# Patient Record
(demographics unavailable — no encounter records)

---

## 2024-11-09 NOTE — HISTORY OF PRESENT ILLNESS
[FreeTextEntry8] : 64-year-old male with hypertension presenting today for establishment of care visit and an acute visit.  His last physical was March of this year.  He has had high blood pressure for many years and is taking 4 different classes of medications for blood pressure management.  He states that when he goes to the doctor's office the blood pressure usually goes up however when he checks at home it is usually in the 110s over 70s. He states that he has a high stress job (he is a ) and does not have time to exercise.  He reports sleeping well.  He is reporting a left lower eyelid mole and wants to see a dermatologist.

## 2024-11-09 NOTE — HEALTH RISK ASSESSMENT
[No] : In the past 12 months have you used drugs other than those required for medical reasons? No [No falls in past year] : Patient reported no falls in the past year [0] : 2) Feeling down, depressed, or hopeless: Not at all (0) [Never] : Never [PHQ-2 Negative - No further assessment needed] : PHQ-2 Negative - No further assessment needed [de-identified] : None [de-identified] : None [de-identified] : Normal [de-identified] : Normal [[id number]] : 0

## 2024-11-09 NOTE — COUNSELING
[Encouraged to increase physical activity] : Encouraged to increase physical activity [Good understanding] : Patient has a good understanding of disease, goals and obesity follow-up plan [FreeTextEntry4] : 20

## 2025-01-17 NOTE — ASSESSMENT
[FreeTextEntry1] : 1) Neoplasm of uncertain behavior of skin, left cheek - r/o BCC - shave tangential bx performed The risks/benefits/alternatives of skin biopsy were explained to the patient which include but are not limited to scar, bleeding, infection, and recurrence. The area was prepped with rubbing alcohol, lidocaine was injected for anesthesia and biopsy was performed. The patient tolerated the procedure well.   RTC for FBSE when interested

## 2025-01-17 NOTE — HISTORY OF PRESENT ILLNESS
[FreeTextEntry1] : new pt: bump [de-identified] : 63 y/o M presenting for a bump on the left cheek that has been growing over at least months.   No personal or FH of skin cancer.

## 2025-03-08 NOTE — PLAN
[FreeTextEntry1] :  #HCM - CBC with differential, HIV, Hep C, CMP, hemoglobin A1c, vitamin B12, vitamin D, urine analysis, lipid panel, TFTs all ordered. Patient to be informed of results. - Prostate cancer screening discussed. PSA blood test has been ordered. - The patient has screened negative for depression  - The patient has never used tobacco products. - The [place] Preventive Service Task Force recommends yearly lung cancer screening with LDCT for people who have a 20 pack-year or more smoking history and smoke now or have quit within the past 15 years and are between 50 and 80 years old. - Colon cancer screening was discussed at today's visit; he is due for colonoscopy in 2027. - EKG performed in the office today is within normal limits. - Counseled on maintaining a healthy body weight. It has been estimated that up to one-third of cardiovascular disease deaths may be preventable by healthy diet and physical activity.

## 2025-03-08 NOTE — HEALTH RISK ASSESSMENT
[Good] : ~his/her~  mood as  good [Yes] : Yes [Monthly or less (1 pt)] : Monthly or less (1 point) [1 or 2 (0 pts)] : 1 or 2 (0 points) [Never (0 pts)] : Never (0 points) [No] : In the past 12 months have you used drugs other than those required for medical reasons? No [No falls in past year] : Patient reported no falls in the past year [0] : 2) Feeling down, depressed, or hopeless: Not at all (0) [Never] : Never [NO] : No [Patient reported colonoscopy was normal] : Patient reported colonoscopy was normal [HIV Test offered] : HIV Test offered [Hepatitis C test offered] : Hepatitis C test offered [With Family] : lives with family [Employed] : employed [] :  [# Of Children ___] : has [unfilled] children [Sexually Active] : sexually active [Feels Safe at Home] : Feels safe at home [Fully functional (bathing, dressing, toileting, transferring, walking, feeding)] : Fully functional (bathing, dressing, toileting, transferring, walking, feeding) [Fully functional (using the telephone, shopping, preparing meals, housekeeping, doing laundry, using] : Fully functional and needs no help or supervision to perform IADLs (using the telephone, shopping, preparing meals, housekeeping, doing laundry, using transportation, managing medications and managing finances) [Smoke Detector] : smoke detector [Carbon Monoxide Detector] : carbon monoxide detector [PHQ-2 Negative - No further assessment needed] : PHQ-2 Negative - No further assessment needed [de-identified] : none [de-identified] : none [de-identified] : sometimes [Audit-CScore] : 1 [de-identified] : Normal walk [de-identified] : no diet [[id number]] : 0 [Change in mental status noted] : No change in mental status noted [Language] : denies difficulty with language [Behavior] : denies difficulty with behavior [Learning/Retaining New Information] : denies difficulty learning/retaining new information [Handling Complex Tasks] : denies difficulty handling complex tasks [Reasoning] : denies difficulty with reasoning [Spatial Ability and Orientation] : denies difficulty with spatial ability and orientation [Reports changes in hearing] : Reports no changes in hearing [Reports normal functional visual acuity (ie: able to read med bottle)] : Reports poor functional visual acuity.  [Reports changes in vision] : Reports no changes in vision [Reports changes in dental health] : Reports no changes in dental health [Safety elements used in home] : no safety elements used in home [Seat Belt] : does not use seat belt [Sunscreen] : does not use sunscreen [Travel to Developing Areas] : does not  travel to developing areas [Caregiver Concerns] : does not have caregiver concerns [ColonoscopyDate] : 01/2022

## 2025-03-08 NOTE — COUNSELING
[AUDIT-C Screening administered and reviewed] : AUDIT-C Screening administered and reviewed [FreeTextEntry1] : 20 [Potential consequences of obesity discussed] : Potential consequences of obesity discussed [FreeTextEntry4] : 20

## 2025-03-08 NOTE — HISTORY OF PRESENT ILLNESS
[de-identified] : 64-year-old male with longstanding hypertension and obesity is presenting today for a physical examination.  He states that he checks his blood pressure at home occasionally and usually gets mid 130s systolic over mid 80s diastolic.  He has been on 4 different blood pressure medications for several years.  He reports having whitecoat hypertension.

## 2025-04-30 NOTE — HEALTH RISK ASSESSMENT
[Little interest or pleasure doing things] : 1) Little interest or pleasure doing things [Feeling down, depressed, or hopeless] : 2) Feeling down, depressed, or hopeless [0] : 2) Feeling down, depressed, or hopeless: Not at all (0) [PHQ-2 Negative - No further assessment needed] : PHQ-2 Negative - No further assessment needed [Never] : Never [de-identified] : no [de-identified] : no [CPD0Ccfms] : 0

## 2025-04-30 NOTE — HISTORY OF PRESENT ILLNESS
[FreeTextEntry1] : pt here for a follow-up regarding his blood pressure  [de-identified] : 64-year-old male with longstanding hypertension and obesity presenting today for a follow up visit. He has checked his BP for the past 2 weeks and the recordings are within normal limits. 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 He has gained 10 lbs. in the past 2 months. He reports going for a stress test in the past (stress TTE reviewed from [facility]22 at [facility]). He also reports a bruise on his left foot that he thinks is from driving and being stressed.

## 2025-04-30 NOTE — PLAN
[FreeTextEntry1] :  Total time spent caring for the patient today was 35 minutes. This includes time spent before the visit reviewing the chart, time spent during the visit, and time spent after the visit on documentation, etc.

## 2025-05-05 NOTE — HISTORY OF PRESENT ILLNESS
[FreeTextEntry1] : Mohs surgery for a BCC, pigmented nodular type, on the left cheek/eyelid junction over the nasojugal fold [de-identified] : 05/05/2025  Referred by: Dr. Thomas  Mr. NGHIA RUSS is a 64 year old [place] and English-speaking M who presents for Mohs surgery for a BCC, pigmented nodular type, on the left cheek/eyelid junction over the nasojugal fold. The lesion was growing as a reddish brown bump.  Consult done 03/12/2025   Social History: Originally from near [facility]. Works as a . .   Blood thinners: ASA 81mg and fish oil - preventative (HTN) - no history of stroke Allergies: None Controlled HTN No tobacco

## 2025-05-05 NOTE — HISTORY OF PRESENT ILLNESS
[FreeTextEntry1] : Mohs surgery for a BCC, pigmented nodular type, on the left cheek/eyelid junction over the nasojugal fold [de-identified] : 05/05/2025  Referred by: Dr. Thomas  Mr. NGHIA RUSS is a 64 year old [place] and English-speaking M who presents for Mohs surgery for a BCC, pigmented nodular type, on the left cheek/eyelid junction over the nasojugal fold. The lesion was growing as a reddish brown bump.  Consult done 03/12/2025   Social History: Originally from near [facility]. Works as a . .   Blood thinners: ASA 81mg and fish oil - preventative (HTN) - no history of stroke Allergies: None Controlled HTN No tobacco

## 2025-05-05 NOTE — PHYSICAL EXAM
[Alert] : alert [Oriented x 3] : ~L oriented x 3 [Well Nourished] : well nourished [Conjunctiva Non-injected] : conjunctiva non-injected [No Visual Lymphadenopathy] : no visual  lymphadenopathy [No Clubbing] : no clubbing [No Edema] : no edema [No Bromhidrosis] : no bromhidrosis [No Chromhidrosis] : no chromhidrosis [FreeTextEntry3] : -- 1.0 x0.8cm atrophic bx site over the left nasojugal fold medially

## 2025-05-14 NOTE — HISTORY OF PRESENT ILLNESS
[FreeTextEntry1] : pt is here for follow up imaging  [de-identified] : 64-year-old male with longstanding hypertension and obesity presenting today for a follow up visit. Recent imaging results reviewed with patient today. Patient verbalized understanding and all questions answered. He denies any chest pain or sob with exertion. Reports being told that he had a cyst on his right lung but no intervention was done.

## 2025-05-14 NOTE — COUNSELING
[Potential consequences of obesity discussed] : Potential consequences of obesity discussed [Benefits of weight loss discussed] : Benefits of weight loss discussed [Encouraged to increase physical activity] : Encouraged to increase physical activity [Good understanding] : Patient has a good understanding of disease, goals and obesity follow-up plan [FreeTextEntry4] : 20

## 2025-05-14 NOTE — HEALTH RISK ASSESSMENT
[No falls in past year] : Patient reported no falls in the past year [de-identified] : no [de-identified] : derm

## 2025-05-15 NOTE — PHYSICAL EXAM
[Well Developed] : well developed [Well Nourished] : well nourished [No Acute Distress] : no acute distress [Normal Conjunctiva] : normal conjunctiva [Normal Venous Pressure] : normal venous pressure [No Carotid Bruit] : no carotid bruit [Normal S1, S2] : normal S1, S2 [No Rub] : no rub [No Gallop] : no gallop [Clear Lung Fields] : clear lung fields [Good Air Entry] : good air entry [No Respiratory Distress] : no respiratory distress  [Soft] : abdomen soft [Non Tender] : non-tender [No Masses/organomegaly] : no masses/organomegaly [Normal Bowel Sounds] : normal bowel sounds [Normal Gait] : normal gait [No Edema] : no edema [No Cyanosis] : no cyanosis [No Clubbing] : no clubbing [No Varicosities] : no varicosities [No Rash] : no rash [No Skin Lesions] : no skin lesions [Moves all extremities] : moves all extremities [No Focal Deficits] : no focal deficits [Normal Speech] : normal speech [Alert and Oriented] : alert and oriented [Normal memory] : normal memory [de-identified] : +II/VI Murmur

## 2025-05-15 NOTE — HISTORY OF PRESENT ILLNESS
[FreeTextEntry1] : Dear Manoj,   I had the pleasure of seeing your patient NGHIA RUSS for Cardiometabolic evaluation.   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 Not controlled - Atorva 40; LDLc still uncontrolled at 165 - Start Repatha   ASCVD By Calcium Score CAC 1161 AU  - Stress EKG  Mn Systolic Murmur (holosystolic) I.VI apical c/f MR AND VASQUEZ New - TTE  HTN - Assessed. Stable - Amlodipine 10; Cavredilol 25 - HCTZ 25; Losartan 100    CC: SOB - Patient reports no chest pain, no localization to the sternum, no radiation to the neck/jaw, no alleviating nor worsening precipitants to CP, no assoc symptoms to CP - Reports No associated F/C/N/V/Headaches - Reports Normal Exercise Tolerance - Reports no medication changes - Reports normal mood/quality of life - Reports no associated midnight awakenings from cP - Reports no diet changes - Reports no associated body aches - Reports no recent colds/viruses - Recent labs/imaging reviewed - Relevant Family history reviewed Mother/Father - CV Risk Assessment for 10 Year ACC/AHA Pooled Risk Cohort Equation places this person at < 7.5% Risk of ASCVD

## 2025-05-15 NOTE — DISCUSSION/SUMMARY
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 Not controlled - Atorva 40; LDLc still uncontrolled at 165 - Start Repatha   ASCVD By Calcium Score CAC 1161 AU  - Stress EKG  Mn Systolic Murmur (holosystolic) I.VI apical c/f MR AND VASQUEZ New - TTE  HTN - Assessed. Stable - Amlodipine 10; Cavredilol 25 - HCTZ 25; Losartan 100     Manoj, kind thanks for the referral.   Jam Stahl MD [facility] BREONNA PATINO Director, Preventive Cardiology [facility] [place]                                             Elevated Cholesterol management - Assessed - Impression is active; changes as per above and discussed that specific foods and exercise can lower cholesterol - Discussed diet and exercise at length - Any lifestyle/medication changes as per above BP Variability - Assessed - Impression is active - Discussed reduction in stress, salt to address Risk factors for cardiomyopathy - Assessed - Impression is stable Cardiac Health optimization - Discussed diet and exercise at length - Discussed importance of monitoring and re-assessment of cardiac health on further visits                                                                                                                                                                                                                             --                                                                                                                                                                                                                                                                                                                                                                                                                                                                                                                                                                                                                                                                                                                            -----------